# Patient Record
Sex: FEMALE | Race: BLACK OR AFRICAN AMERICAN | NOT HISPANIC OR LATINO | URBAN - METROPOLITAN AREA
[De-identification: names, ages, dates, MRNs, and addresses within clinical notes are randomized per-mention and may not be internally consistent; named-entity substitution may affect disease eponyms.]

---

## 2020-07-10 ENCOUNTER — OFFICE VISIT (OUTPATIENT)
Dept: URBAN - METROPOLITAN AREA CLINIC 54 | Facility: CLINIC | Age: 30
End: 2020-07-10
Payer: COMMERCIAL

## 2020-07-10 ENCOUNTER — DASHBOARD ENCOUNTERS (OUTPATIENT)
Age: 30
End: 2020-07-10

## 2020-07-10 DIAGNOSIS — R10.84 ABDOMINAL PAIN, GENERALIZED: ICD-10-CM

## 2020-07-10 DIAGNOSIS — K59.01 CONSTIPATION: ICD-10-CM

## 2020-07-10 DIAGNOSIS — R14.0 BLOATING: ICD-10-CM

## 2020-07-10 DIAGNOSIS — R19.7 DIARRHEA: ICD-10-CM

## 2020-07-10 PROCEDURE — 99203 OFFICE O/P NEW LOW 30 MIN: CPT | Performed by: INTERNAL MEDICINE

## 2020-07-10 RX ORDER — LEVONORGESTREL / ETHINYL ESTRADIOL AND ETHINYL ESTRADIOL 150-30(84)
1 TABLET KIT ORAL ONCE A DAY
Status: ACTIVE | COMMUNITY

## 2020-07-10 RX ORDER — SODIUM, POTASSIUM,MAG SULFATES 17.5-3.13G
177 ML SOLUTION, RECONSTITUTED, ORAL ORAL ONCE
Qty: 1 | Refills: 0 | OUTPATIENT
Start: 2020-07-10 | End: 2020-07-11

## 2020-07-10 NOTE — HPI-TODAY'S VISIT:
Patient is a 28 yo woman referred by Luba Zepeda NP for varied GI complaints for several years. A copy of this document will be sent to referring provider. She has abdominal pain, alternating diarrhea and constipation and bloating. Her symptoms are not consistent and bother her around 4 days of the week. Spicy foods, alcohol and caffeine bother her. However symptoms   can be random. Abdominal pain is periumbilical and at worst is 7/10 in intensity. She has tried Peptobismol and Pepcid with no relief. Pain can last all day culminating in loose diarrhea with scant blood. She has occasional nocturnal symptoms. No known food allergies but is sensitive to Splenda with nausea. She drinks 1 energy drink per day. No use of MJ. No family history of UC or CD.  Appetite is OK and weight is stable. She is a  at an animal [employer] and considers her job stressful. She had an  5 years ago. She has been diagnosed with major depressive disorder, not on any medications. She has taken Escitalopram in the past. Recent CBC/CMP was normal.

## 2020-07-28 ENCOUNTER — OFFICE VISIT (OUTPATIENT)
Dept: URBAN - METROPOLITAN AREA SURGERY CENTER 14 | Facility: SURGERY CENTER | Age: 30
End: 2020-07-28

## 2020-08-31 ENCOUNTER — TELEPHONE ENCOUNTER (OUTPATIENT)
Dept: URBAN - METROPOLITAN AREA CLINIC 54 | Facility: CLINIC | Age: 30
End: 2020-08-31

## 2020-08-31 RX ORDER — ESCITALOPRAM 10 MG/1
1 TABLET TABLET, FILM COATED ORAL ONCE A DAY
Qty: 30 | OUTPATIENT
Start: 2020-08-31

## 2020-08-31 RX ORDER — LEVONORGESTREL / ETHINYL ESTRADIOL AND ETHINYL ESTRADIOL 150-30(84)
1 TABLET KIT ORAL ONCE A DAY
Status: ACTIVE | COMMUNITY

## 2020-08-31 RX ORDER — DEXTROAMPHETAMINE/AMPHETAMINE 10 MG
1 CAPSULE IN THE MORNING CAPSULE, EXT RELEASE 24 HR ORAL ONCE A DAY
OUTPATIENT
Start: 2020-08-31

## 2020-09-03 LAB
C DIFFICILE TOXIN GENE NAA: NEGATIVE
CAMPYLOBACTER CULTURE: (no result)
E COLI SHIGA TOXIN EIA: NEGATIVE
GIARDIA LAMBLIA AG, EIA: NEGATIVE
Lab: (no result)
OVA + PARASITE EXAM: (no result)
SALMONELLA/SHIGELLA SCREEN: (no result)
WHITE BLOOD CELLS (WBC), STOOL: (no result)

## 2020-09-28 ENCOUNTER — OFFICE VISIT (OUTPATIENT)
Dept: URBAN - METROPOLITAN AREA SURGERY CENTER 14 | Facility: SURGERY CENTER | Age: 30
End: 2020-09-28

## 2020-09-29 ENCOUNTER — OFFICE VISIT (OUTPATIENT)
Dept: URBAN - METROPOLITAN AREA SURGERY CENTER 14 | Facility: SURGERY CENTER | Age: 30
End: 2020-09-29

## 2020-10-13 ENCOUNTER — OFFICE VISIT (OUTPATIENT)
Dept: URBAN - METROPOLITAN AREA SURGERY CENTER 14 | Facility: SURGERY CENTER | Age: 30
End: 2020-10-13